# Patient Record
Sex: FEMALE | Race: BLACK OR AFRICAN AMERICAN | Employment: FULL TIME | URBAN - METROPOLITAN AREA
[De-identification: names, ages, dates, MRNs, and addresses within clinical notes are randomized per-mention and may not be internally consistent; named-entity substitution may affect disease eponyms.]

---

## 2017-08-07 PROBLEM — Z34.90 PREGNANT: Status: ACTIVE | Noted: 2017-08-07

## 2018-11-15 PROBLEM — E66.01 SEVERE OBESITY (HCC): Status: ACTIVE | Noted: 2018-11-15

## 2018-11-29 PROBLEM — F41.9 ANXIETY: Status: ACTIVE | Noted: 2018-11-29

## 2018-12-13 PROBLEM — E66.09 CLASS 1 OBESITY DUE TO EXCESS CALORIES IN ADULT: Status: ACTIVE | Noted: 2018-11-15

## 2018-12-13 PROBLEM — E66.01 SEVERE OBESITY (HCC): Status: RESOLVED | Noted: 2018-11-15 | Resolved: 2018-12-13

## 2021-05-23 PROBLEM — Z78.9 USES BIRTH CONTROL: Status: RESOLVED | Noted: 2018-11-19 | Resolved: 2021-05-23

## 2022-03-19 PROBLEM — E66.811 CLASS 1 OBESITY DUE TO EXCESS CALORIES IN ADULT: Status: ACTIVE | Noted: 2018-11-15

## 2022-03-19 PROBLEM — E66.09 CLASS 1 OBESITY DUE TO EXCESS CALORIES IN ADULT: Status: ACTIVE | Noted: 2018-11-15

## 2022-03-20 PROBLEM — F41.9 ANXIETY: Status: ACTIVE | Noted: 2018-11-29

## 2022-11-15 ENCOUNTER — HOSPITAL ENCOUNTER (EMERGENCY)
Age: 32
Discharge: HOME | End: 2022-11-15
Attending: EMERGENCY MEDICINE
Payer: COMMERCIAL

## 2022-11-15 VITALS
HEART RATE: 82 BPM | DIASTOLIC BLOOD PRESSURE: 78 MMHG | OXYGEN SATURATION: 100 % | RESPIRATION RATE: 16 BRPM | SYSTOLIC BLOOD PRESSURE: 118 MMHG | TEMPERATURE: 97.4 F

## 2022-11-15 DIAGNOSIS — S46.911A MUSCLE STRAIN OF RIGHT UPPER ARM, INITIAL ENCOUNTER: Primary | ICD-10-CM

## 2022-11-15 PROCEDURE — 99283 EMERGENCY DEPT VISIT LOW MDM: CPT

## 2022-11-15 RX ORDER — CYCLOBENZAPRINE HCL 10 MG
5 TABLET ORAL
Qty: 10 TABLET | Refills: 0 | Status: SHIPPED | OUTPATIENT
Start: 2022-11-15

## 2022-11-15 NOTE — ED PROVIDER NOTES
27year old F at about 12 weeks gestation,  presenting for RIGHT arm pain. Pt notes that she was reaching in the back of her car and then started with pain in the right upper arm. \"I don't know if I pulled or tore something. \"  Notes that she has to help to lift the arm to help it. Injury occurred about 3-5 days ago. Has not tried any Tylenol. PMHx and Psx: lists UTD  Social: non-smoker. No alcohol. [employer] nurse. The history is provided by the patient. Arm Pain        Past Medical History:   Diagnosis Date    Abnormal Pap smear     hx  of colpo; With Pregnancy.  Will do follow up after delivery    Anemia     TAKING IRON    Anxiety 2018    Class 1 obesity due to excess calories in adult 11/15/2018    HX OTHER MEDICAL     clhlamydia ; treated, negative now    Unspecified breast disorder     rt breast cyst ; lumpectomy       Past Surgical History:   Procedure Laterality Date    HX BREAST BIOPSY Right      benign surgical biopsi    HX OTHER SURGICAL      [place] Teeth Removal    HX OTHER SURGICAL      Lump removed from right breast-Benign    MA BREAST SURGERY PROCEDURE UNLISTED      cyst removal.         Family History:   Problem Relation Age of Onset    Diabetes Father     Cancer Father         prostate    No Known Problems Sister     No Known Problems Brother        Social History     Socioeconomic History    Marital status: SINGLE     Spouse name: Not on file    Number of children: Not on file    Years of education: Not on file    Highest education level: Not on file   Occupational History    Not on file   Tobacco Use    Smoking status: Some Days     Types: Cigars    Smokeless tobacco: Never    Tobacco comments:     may smoke a couple black and mild when out with friends   Substance and Sexual Activity    Alcohol use: No    Drug use: No    Sexual activity: Yes     Partners: Male   Other Topics Concern    Not on file   Social History Narrative    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  Also does CNA work occasionally     Social Determinants of Health     Financial Resource Strain: Not on file   Food Insecurity: Not on file   Transportation Needs: Not on file   Physical Activity: Not on file   Stress: Not on file   Social Connections: Not on file   Intimate Partner Violence: Not on file   Housing Stability: Not on file         ALLERGIES: Patient has no known allergies. Review of Systems   Constitutional:  Negative for fever. HENT:  Negative for facial swelling. Respiratory:  Negative for shortness of breath. Cardiovascular:  Negative for chest pain. Gastrointestinal:  Negative for vomiting. Musculoskeletal:         + arm pain   Skin:  Negative for wound. Neurological:  Negative for syncope. All other systems reviewed and are negative. Vitals:    11/15/22 1819   BP: 118/78   Pulse: 82   Resp: 16   Temp: 97.4 °F (36.3 °C)   SpO2: 100%            Physical Exam  Vitals and nursing note reviewed. Constitutional:       General: She is not in acute distress. Appearance: She is well-developed. Comments: Well-appearing, no distress   HENT:      Head: Normocephalic and atraumatic. Right Ear: External ear normal.      Left Ear: External ear normal.   Eyes:      General: No scleral icterus. Conjunctiva/sclera: Conjunctivae normal.   Neck:      Trachea: No tracheal deviation. Cardiovascular:      Rate and Rhythm: Normal rate and regular rhythm. Heart sounds: Normal heart sounds. No murmur heard. No friction rub. No gallop. Pulmonary:      Effort: Pulmonary effort is normal. No respiratory distress. Breath sounds: Normal breath sounds. No stridor. No wheezing. Abdominal:      General: There is no distension. Palpations: Abdomen is soft. Musculoskeletal:         General: Normal range of motion. Cervical back: Neck supple. Comments: Right arm: Exposed for exam.  Symmetric to visible inspection.   Questionable subtle swelling noted over the proximal deltoid, mild tenderness to deep palpation. Patient initially stated she was unable to lift the arm, however with some encouragement was able to lift her arm over her head. Normal distal  strength, pulse. Skin:     General: Skin is warm and dry. Neurological:      Mental Status: She is alert and oriented to person, place, and time. Psychiatric:         Behavior: Behavior normal.        MDM  Number of Diagnoses or Management Options  Muscle strain of right upper arm, initial encounter  Diagnosis management comments: 75-year-old female presenting to the ED for right arm pain that started after she reached behind the seat in her car yesterday. Patient with tenderness over the deltoid. No blunt trauma, loss of range of motion, etc. to necessitate imaging and patient is in her first trimester of pregnancy. Discussed likely muscular injury, patient states that she cannot take Tylenol as it upsets her stomach, will write for a short course of pregnancy safe muscle relaxer, sling for a few days as needed.        Amount and/or Complexity of Data Reviewed  Discuss the patient with other providers: (Dr. Claudean Held ED attending available for discussion)           Procedures

## 2022-11-15 NOTE — DISCHARGE INSTRUCTIONS
Follow RICE instructions. Wear sling x 3-5 days but not any longer. Remove arm from sling 10 times a day and move as much as possible to prevent a frozen shoulder. If not improving after 5 days, make a follow up with Ortho - Dr. Shira Badillo.

## 2022-11-15 NOTE — LETTER
[address]  [address] [facility] [id number]  [phone]    Work/School Note    Date: 11/15/2022    To Whom It May concern:    Carlee Jarrett was seen and treated today in the emergency room by the following provider(s):  Attending Provider: Roosevelt Andres MD  Physician Assistant: ZAKIA Gaytan. Carlee Jarrett may return to work on 11/16/22 but should not lift more than 5lb with her right arm for a week.     Sincerely,          ZAKIA Hess

## 2022-11-15 NOTE — ED TRIAGE NOTES
She turned to get something out of the back seat and pulled her right arm, 5 days ago. she is still having pain 5/10 but worse when she tries to lift her arm. The pain is just below her shoulder. She is pregnant. Due in May.

## 2025-08-23 ENCOUNTER — HOSPITAL ENCOUNTER (EMERGENCY)
Facility: HOSPITAL | Age: 35
Discharge: HOME | End: 2025-08-23
Attending: EMERGENCY MEDICINE
Payer: COMMERCIAL

## 2025-08-23 VITALS
OXYGEN SATURATION: 98 % | HEART RATE: 92 BPM | TEMPERATURE: 98.6 F | RESPIRATION RATE: 18 BRPM | SYSTOLIC BLOOD PRESSURE: 129 MMHG | DIASTOLIC BLOOD PRESSURE: 88 MMHG

## 2025-08-23 DIAGNOSIS — S69.92XA FINGER INJURY, LEFT, INITIAL ENCOUNTER: Primary | ICD-10-CM

## 2025-08-23 PROCEDURE — 6360000002 HC RX W HCPCS: Performed by: NURSE PRACTITIONER

## 2025-08-23 PROCEDURE — 90471 IMMUNIZATION ADMIN: CPT | Performed by: NURSE PRACTITIONER

## 2025-08-23 PROCEDURE — 99284 EMERGENCY DEPT VISIT MOD MDM: CPT

## 2025-08-23 PROCEDURE — 6370000000 HC RX 637 (ALT 250 FOR IP): Performed by: NURSE PRACTITIONER

## 2025-08-23 PROCEDURE — 90714 TD VACC NO PRESV 7 YRS+ IM: CPT | Performed by: NURSE PRACTITIONER

## 2025-08-23 RX ORDER — IBUPROFEN 400 MG/1
800 TABLET, FILM COATED ORAL
Status: COMPLETED | OUTPATIENT
Start: 2025-08-23 | End: 2025-08-23

## 2025-08-23 RX ORDER — GINSENG 100 MG
CAPSULE ORAL
Status: COMPLETED | OUTPATIENT
Start: 2025-08-23 | End: 2025-08-23

## 2025-08-23 RX ADMIN — CLOSTRIDIUM TETANI TOXOID ANTIGEN (FORMALDEHYDE INACTIVATED) AND CORYNEBACTERIUM DIPHTHERIAE TOXOID ANTIGEN (FORMALDEHYDE INACTIVATED) 0.5 ML: 5; 2 INJECTION, SUSPENSION INTRAMUSCULAR at 09:01

## 2025-08-23 RX ADMIN — BACITRACIN 1 PACKET: 500 OINTMENT TOPICAL at 08:59

## 2025-08-23 RX ADMIN — IBUPROFEN 800 MG: 400 TABLET, FILM COATED ORAL at 09:00

## 2025-08-23 ASSESSMENT — PAIN DESCRIPTION - ORIENTATION: ORIENTATION: LEFT

## 2025-08-23 ASSESSMENT — PAIN SCALES - GENERAL
PAINLEVEL_OUTOF10: 0
PAINLEVEL_OUTOF10: 8

## 2025-08-23 ASSESSMENT — PAIN DESCRIPTION - LOCATION: LOCATION: FINGER (COMMENT WHICH ONE)

## 2025-08-23 ASSESSMENT — PAIN - FUNCTIONAL ASSESSMENT: PAIN_FUNCTIONAL_ASSESSMENT: 0-10
